# Patient Record
Sex: FEMALE | Race: WHITE | Employment: UNEMPLOYED | URBAN - METROPOLITAN AREA
[De-identification: names, ages, dates, MRNs, and addresses within clinical notes are randomized per-mention and may not be internally consistent; named-entity substitution may affect disease eponyms.]

---

## 2019-01-01 ENCOUNTER — ANESTHESIA EVENT (OUTPATIENT)
Dept: SURGERY | Age: 29
End: 2019-01-01
Payer: MEDICAID

## 2019-01-02 ENCOUNTER — APPOINTMENT (OUTPATIENT)
Dept: GENERAL RADIOLOGY | Age: 29
End: 2019-01-02
Attending: UROLOGY
Payer: MEDICAID

## 2019-01-02 ENCOUNTER — ANESTHESIA (OUTPATIENT)
Dept: SURGERY | Age: 29
End: 2019-01-02
Payer: MEDICAID

## 2019-01-02 ENCOUNTER — HOSPITAL ENCOUNTER (OUTPATIENT)
Age: 29
Setting detail: OUTPATIENT SURGERY
Discharge: HOME | End: 2019-01-02
Attending: UROLOGY | Admitting: UROLOGY
Payer: MEDICAID

## 2019-01-02 VITALS
SYSTOLIC BLOOD PRESSURE: 116 MMHG | WEIGHT: 128.38 LBS | DIASTOLIC BLOOD PRESSURE: 72 MMHG | HEIGHT: 62 IN | TEMPERATURE: 98.1 F | BODY MASS INDEX: 23.62 KG/M2 | HEART RATE: 65 BPM | RESPIRATION RATE: 16 BRPM | OXYGEN SATURATION: 98 %

## 2019-01-02 LAB — HCG UR QL: NEGATIVE

## 2019-01-02 PROCEDURE — 77030018836 HC SOL IRR NACL ICUM -A: Performed by: UROLOGY

## 2019-01-02 PROCEDURE — 74011000272 HC RX REV CODE- 272: Performed by: UROLOGY

## 2019-01-02 PROCEDURE — C1758 CATHETER, URETERAL: HCPCS | Performed by: UROLOGY

## 2019-01-02 PROCEDURE — 76060000032 HC ANESTHESIA 0.5 TO 1 HR: Performed by: UROLOGY

## 2019-01-02 PROCEDURE — 74011250637 HC RX REV CODE- 250/637: Performed by: SPECIALIST

## 2019-01-02 PROCEDURE — 74011250636 HC RX REV CODE- 250/636: Performed by: UROLOGY

## 2019-01-02 PROCEDURE — 77030020782 HC GWN BAIR PAWS FLX 3M -B: Performed by: UROLOGY

## 2019-01-02 PROCEDURE — C1769 GUIDE WIRE: HCPCS | Performed by: UROLOGY

## 2019-01-02 PROCEDURE — 76010000138 HC OR TIME 0.5 TO 1 HR: Performed by: UROLOGY

## 2019-01-02 PROCEDURE — 76210000021 HC REC RM PH II 0.5 TO 1 HR: Performed by: UROLOGY

## 2019-01-02 PROCEDURE — 77030032490 HC SLV COMPR SCD KNE COVD -B: Performed by: UROLOGY

## 2019-01-02 PROCEDURE — 77030012508 HC MSK AIRWY LMA AMBU -A: Performed by: SPECIALIST

## 2019-01-02 PROCEDURE — 74011250636 HC RX REV CODE- 250/636

## 2019-01-02 PROCEDURE — 74011636320 HC RX REV CODE- 636/320: Performed by: UROLOGY

## 2019-01-02 PROCEDURE — 81025 URINE PREGNANCY TEST: CPT

## 2019-01-02 PROCEDURE — C1894 INTRO/SHEATH, NON-LASER: HCPCS | Performed by: UROLOGY

## 2019-01-02 PROCEDURE — 76210000006 HC OR PH I REC 0.5 TO 1 HR: Performed by: UROLOGY

## 2019-01-02 PROCEDURE — 74420 UROGRAPHY RTRGR +-KUB: CPT

## 2019-01-02 PROCEDURE — C2617 STENT, NON-COR, TEM W/O DEL: HCPCS | Performed by: UROLOGY

## 2019-01-02 DEVICE — URETERAL STENT
Type: IMPLANTABLE DEVICE | Site: URETER | Status: FUNCTIONAL
Brand: POLARIS™ ULTRA

## 2019-01-02 RX ORDER — LIDOCAINE HYDROCHLORIDE 20 MG/ML
INJECTION, SOLUTION EPIDURAL; INFILTRATION; INTRACAUDAL; PERINEURAL AS NEEDED
Status: DISCONTINUED | OUTPATIENT
Start: 2019-01-02 | End: 2019-01-02 | Stop reason: HOSPADM

## 2019-01-02 RX ORDER — SODIUM CHLORIDE 0.9 % (FLUSH) 0.9 %
5-10 SYRINGE (ML) INJECTION EVERY 8 HOURS
Status: CANCELLED | OUTPATIENT
Start: 2019-01-02

## 2019-01-02 RX ORDER — MAGNESIUM SULFATE 100 %
4 CRYSTALS MISCELLANEOUS AS NEEDED
Status: DISCONTINUED | OUTPATIENT
Start: 2019-01-02 | End: 2019-01-02 | Stop reason: HOSPADM

## 2019-01-02 RX ORDER — DEXAMETHASONE SODIUM PHOSPHATE 4 MG/ML
INJECTION, SOLUTION INTRA-ARTICULAR; INTRALESIONAL; INTRAMUSCULAR; INTRAVENOUS; SOFT TISSUE AS NEEDED
Status: DISCONTINUED | OUTPATIENT
Start: 2019-01-02 | End: 2019-01-02 | Stop reason: HOSPADM

## 2019-01-02 RX ORDER — CEFAZOLIN SODIUM/WATER 2 G/20 ML
2 SYRINGE (ML) INTRAVENOUS ONCE
Status: COMPLETED | OUTPATIENT
Start: 2019-01-02 | End: 2019-01-02

## 2019-01-02 RX ORDER — SODIUM CHLORIDE, SODIUM LACTATE, POTASSIUM CHLORIDE, CALCIUM CHLORIDE 600; 310; 30; 20 MG/100ML; MG/100ML; MG/100ML; MG/100ML
100 INJECTION, SOLUTION INTRAVENOUS CONTINUOUS
Status: DISCONTINUED | OUTPATIENT
Start: 2019-01-02 | End: 2019-01-02 | Stop reason: HOSPADM

## 2019-01-02 RX ORDER — ONDANSETRON 2 MG/ML
4 INJECTION INTRAMUSCULAR; INTRAVENOUS ONCE
Status: DISCONTINUED | OUTPATIENT
Start: 2019-01-02 | End: 2019-01-02 | Stop reason: HOSPADM

## 2019-01-02 RX ORDER — FLUMAZENIL 0.1 MG/ML
0.2 INJECTION INTRAVENOUS
Status: DISCONTINUED | OUTPATIENT
Start: 2019-01-02 | End: 2019-01-02 | Stop reason: HOSPADM

## 2019-01-02 RX ORDER — SODIUM CHLORIDE 0.9 % (FLUSH) 0.9 %
5-10 SYRINGE (ML) INJECTION AS NEEDED
Status: CANCELLED | OUTPATIENT
Start: 2019-01-02

## 2019-01-02 RX ORDER — ONDANSETRON 2 MG/ML
INJECTION INTRAMUSCULAR; INTRAVENOUS AS NEEDED
Status: DISCONTINUED | OUTPATIENT
Start: 2019-01-02 | End: 2019-01-02 | Stop reason: HOSPADM

## 2019-01-02 RX ORDER — MIDAZOLAM HYDROCHLORIDE 1 MG/ML
INJECTION, SOLUTION INTRAMUSCULAR; INTRAVENOUS AS NEEDED
Status: DISCONTINUED | OUTPATIENT
Start: 2019-01-02 | End: 2019-01-02 | Stop reason: HOSPADM

## 2019-01-02 RX ORDER — INSULIN LISPRO 100 [IU]/ML
INJECTION, SOLUTION INTRAVENOUS; SUBCUTANEOUS ONCE
Status: DISCONTINUED | OUTPATIENT
Start: 2019-01-02 | End: 2019-01-02 | Stop reason: HOSPADM

## 2019-01-02 RX ORDER — PROPOFOL 10 MG/ML
INJECTION, EMULSION INTRAVENOUS AS NEEDED
Status: DISCONTINUED | OUTPATIENT
Start: 2019-01-02 | End: 2019-01-02 | Stop reason: HOSPADM

## 2019-01-02 RX ORDER — DEXTROSE 50 % IN WATER (D50W) INTRAVENOUS SYRINGE
25-50 AS NEEDED
Status: DISCONTINUED | OUTPATIENT
Start: 2019-01-02 | End: 2019-01-02 | Stop reason: HOSPADM

## 2019-01-02 RX ORDER — NALOXONE HYDROCHLORIDE 0.4 MG/ML
0.1 INJECTION, SOLUTION INTRAMUSCULAR; INTRAVENOUS; SUBCUTANEOUS AS NEEDED
Status: DISCONTINUED | OUTPATIENT
Start: 2019-01-02 | End: 2019-01-02 | Stop reason: HOSPADM

## 2019-01-02 RX ORDER — FENTANYL CITRATE 50 UG/ML
INJECTION, SOLUTION INTRAMUSCULAR; INTRAVENOUS AS NEEDED
Status: DISCONTINUED | OUTPATIENT
Start: 2019-01-02 | End: 2019-01-02 | Stop reason: HOSPADM

## 2019-01-02 RX ORDER — SODIUM CHLORIDE, SODIUM LACTATE, POTASSIUM CHLORIDE, CALCIUM CHLORIDE 600; 310; 30; 20 MG/100ML; MG/100ML; MG/100ML; MG/100ML
125 INJECTION, SOLUTION INTRAVENOUS CONTINUOUS
Status: DISCONTINUED | OUTPATIENT
Start: 2019-01-02 | End: 2019-01-02 | Stop reason: HOSPADM

## 2019-01-02 RX ORDER — PHENAZOPYRIDINE HYDROCHLORIDE 100 MG/1
100 TABLET, FILM COATED ORAL
Qty: 15 TAB | Refills: 0 | Status: SHIPPED | OUTPATIENT
Start: 2019-01-02 | End: 2019-01-05

## 2019-01-02 RX ORDER — SODIUM CHLORIDE, SODIUM LACTATE, POTASSIUM CHLORIDE, CALCIUM CHLORIDE 600; 310; 30; 20 MG/100ML; MG/100ML; MG/100ML; MG/100ML
1000 INJECTION, SOLUTION INTRAVENOUS CONTINUOUS
Status: DISCONTINUED | OUTPATIENT
Start: 2019-01-02 | End: 2019-01-02 | Stop reason: HOSPADM

## 2019-01-02 RX ORDER — SODIUM CHLORIDE 0.9 % (FLUSH) 0.9 %
5-10 SYRINGE (ML) INJECTION EVERY 8 HOURS
Status: DISCONTINUED | OUTPATIENT
Start: 2019-01-02 | End: 2019-01-02 | Stop reason: HOSPADM

## 2019-01-02 RX ORDER — OXYCODONE HYDROCHLORIDE 5 MG/1
5 TABLET ORAL
Status: COMPLETED | OUTPATIENT
Start: 2019-01-02 | End: 2019-01-02

## 2019-01-02 RX ORDER — FENTANYL CITRATE 50 UG/ML
25 INJECTION, SOLUTION INTRAMUSCULAR; INTRAVENOUS AS NEEDED
Status: DISCONTINUED | OUTPATIENT
Start: 2019-01-02 | End: 2019-01-02 | Stop reason: HOSPADM

## 2019-01-02 RX ORDER — HYDROMORPHONE HYDROCHLORIDE 2 MG/ML
0.5 INJECTION, SOLUTION INTRAMUSCULAR; INTRAVENOUS; SUBCUTANEOUS
Status: DISCONTINUED | OUTPATIENT
Start: 2019-01-02 | End: 2019-01-02 | Stop reason: HOSPADM

## 2019-01-02 RX ORDER — SODIUM CHLORIDE 0.9 % (FLUSH) 0.9 %
5-10 SYRINGE (ML) INJECTION AS NEEDED
Status: DISCONTINUED | OUTPATIENT
Start: 2019-01-02 | End: 2019-01-02 | Stop reason: HOSPADM

## 2019-01-02 RX ADMIN — DEXAMETHASONE SODIUM PHOSPHATE 4 MG: 4 INJECTION, SOLUTION INTRA-ARTICULAR; INTRALESIONAL; INTRAMUSCULAR; INTRAVENOUS; SOFT TISSUE at 13:29

## 2019-01-02 RX ADMIN — ONDANSETRON 4 MG: 2 INJECTION INTRAMUSCULAR; INTRAVENOUS at 13:29

## 2019-01-02 RX ADMIN — FENTANYL CITRATE 25 MCG: 50 INJECTION, SOLUTION INTRAMUSCULAR; INTRAVENOUS at 13:36

## 2019-01-02 RX ADMIN — Medication 2 G: at 13:30

## 2019-01-02 RX ADMIN — FENTANYL CITRATE 75 MCG: 50 INJECTION, SOLUTION INTRAMUSCULAR; INTRAVENOUS at 14:14

## 2019-01-02 RX ADMIN — OXYCODONE HYDROCHLORIDE 5 MG: 5 TABLET ORAL at 14:25

## 2019-01-02 RX ADMIN — SODIUM CHLORIDE, SODIUM LACTATE, POTASSIUM CHLORIDE, AND CALCIUM CHLORIDE 125 ML/HR: 600; 310; 30; 20 INJECTION, SOLUTION INTRAVENOUS at 12:05

## 2019-01-02 RX ADMIN — MIDAZOLAM HYDROCHLORIDE 4 MG: 1 INJECTION, SOLUTION INTRAMUSCULAR; INTRAVENOUS at 13:19

## 2019-01-02 RX ADMIN — SODIUM CHLORIDE, SODIUM LACTATE, POTASSIUM CHLORIDE, AND CALCIUM CHLORIDE: 600; 310; 30; 20 INJECTION, SOLUTION INTRAVENOUS at 14:13

## 2019-01-02 RX ADMIN — PROPOFOL 200 MG: 10 INJECTION, EMULSION INTRAVENOUS at 13:25

## 2019-01-02 RX ADMIN — LIDOCAINE HYDROCHLORIDE 100 MG: 20 INJECTION, SOLUTION EPIDURAL; INFILTRATION; INTRACAUDAL; PERINEURAL at 13:25

## 2019-01-02 NOTE — ANESTHESIA PREPROCEDURE EVALUATION
Anesthetic History No history of anesthetic complications Review of Systems / Medical History Patient summary reviewed, nursing notes reviewed and pertinent labs reviewed Pulmonary Smoker Neuro/Psych Psychiatric history Cardiovascular Exercise tolerance: >4 METS 
  
GI/Hepatic/Renal 
Within defined limits Endo/Other Within defined limits Other Findings Physical Exam 
 
Airway Mallampati: II 
TM Distance: 4 - 6 cm Neck ROM: normal range of motion Mouth opening: Normal 
 
 Cardiovascular Rhythm: regular Rate: normal 
 
 
 
 Dental 
No notable dental hx Pulmonary Breath sounds clear to auscultation Abdominal 
GI exam deferred Other Findings Anesthetic Plan ASA: 2 Anesthesia type: general 
 
 
 
 
Induction: Intravenous Anesthetic plan and risks discussed with: Patient

## 2019-01-02 NOTE — H&P
H&P Update: 
Cleo Her was seen and examined. History and physical has been reviewed. The patient has been examined.  There have been no significant clinical changes since the completion of the originally dated History and Physical. 
 
Signed By: Derik Avila MD   
 January 2, 2019 1:15 PM

## 2019-01-02 NOTE — PERIOP NOTES
Reviewed PTA medication list with patient/caregiver and patient/caregiver denies any additional medications. Patient admits to having a responsible adult care for them for at least 24 hours after surgery. 
  
Urine pregnancy results Negative and verified with GARETH Winkler

## 2019-01-02 NOTE — ANESTHESIA POSTPROCEDURE EVALUATION
Post-Anesthesia Evaluation and Assessment Patient: Choco Griffith MRN: [id number]  SSN: [id number] YOB: 1990  Age: 33 y.o. Sex: female Cardiovascular Function/Vital Signs /63   Pulse 66   Temp 36.5 °C (97.7 °F)   Resp 11   Ht 5' 2\" (1.575 m)   Wt 58.2 kg (128 lb 6 oz)   SpO2 99%   BMI 23.48 kg/m² Patient is status post Procedure(s): 
CYSTOSCOPY, RIGHT RETROGRADE PYELOGRAM WITH INTERPRETATION,  RIGHT URETEROSCOPY, LASER LITHOTRIPSY WITH HOLMIUM, STENT PLACEMENT WITH C-ARM. Nausea/Vomiting: Controlled. Postoperative hydration reviewed and adequate. Pain: 
Pain Scale 1: Numeric (0 - 10) (01/02/19 1440) Pain Intensity 1: 2 (01/02/19 1440) Managed. Neurological Status:  
Neuro (WDL): Exceptions to WDL (01/02/19 1426) At baseline. Mental Status and Level of Consciousness: Arousable. Pulmonary Status:  
O2 Device: Room air (01/02/19 1440) Adequate oxygenation and airway patent. Complications related to anesthesia: None Post-anesthesia assessment completed. No concerns. Patient has met all discharge requirements. Signed By: Maria Elena Urias CRNA January 2, 2019 Procedure(s): 
CYSTOSCOPY, RIGHT RETROGRADE PYELOGRAM WITH INTERPRETATION,  RIGHT URETEROSCOPY, LASER LITHOTRIPSY WITH HOLMIUM, STENT PLACEMENT WITH C-ARM. <BSHSIANPOST> Visit Vitals /63 Pulse 66 Temp 36.5 °C (97.7 °F) Resp 11 Ht 5' 2\" (1.575 m) Wt 58.2 kg (128 lb 6 oz) SpO2 99% BMI 23.48 kg/m²

## 2019-01-02 NOTE — BRIEF OP NOTE
Date of Procedure: 1/2/2019 Preoperative Diagnosis: RIGHT RENAL STONES Postoperative Diagnosis: RIGHT RENAL STONES Procedure(s): 
CYSTOSCOPY, RIGHT RETROGRADE PYELOGRAM WITH INTERPRETATION,  RIGHT URETEROSCOPY, LASER LITHOTRIPSY WITH HOLMIUM, STENT PLACEMENT WITH C-ARM Surgeon(s) and Role: 
   * Camden Moreno MD - Primary Surgical Assistant: none Surgical Staff: 
Circ-1: Helene Dow Radiology Technician: Ninfa Ledezma Scrub Tech-1: Chiquita Osler Scrub Tech-2: Javan Jason Event Time In Time Out Incision Start [id number] Incision Close 1400 Anesthesia: General  
Estimated Blood Loss: minimal 
Specimens: * No specimens in log * Findings: Right lower pole kidney stone about 1cm. The distal right uretetal stone was not seen. Pt had passed it prior to the case. Complications: None Implants:  
Implant Name Type Inv. Item Serial No.  Lot No. LRB No. Used Action Prudence Tracy DBL-PGTL [id number] Ebony Demarco - [id number]  PLTHIERNOZADRIAN PHYX DBL-PGTL [id number] -- Raegan Matos [facility] [id number] Right 1 Implanted

## 2019-01-02 NOTE — DISCHARGE INSTRUCTIONS
2 [facility], Urology     [facility], [address], [address] [place] [address],  Cecilia Boss  Office: [phone]  Fax:    [phone], right ureteroscopy laser litho, stent placement  __  Notify [facility] IMMEDIATELY if any of the following occur:     You are unable to urinate. Urgency to urinate is not uncommon.   You find yourself urinating small frequent amounts associated with severe lower abdominal discomfort.   Bright red blood with clots in the urine. Some reddish urine is not uncommon and should be treated with increasing the amount of fluids you drink.   Temperature above 101.5° and / or chills.   You are nauseous and / or vomiting and you cannot hold down any fluids.   Your pain is not controlled with the pain medication prescribed. Special Considerations:      Do not drive for at least 24 hours after the procedure and until you are no longer taking narcotic pain medication and you are able to move and react without hesitation.  _x_  No heavy lifting over 30 pounds & no strenuous exercise for 1 week. Resume your pain meds. If your toradol/ketorolac is not approved by your insurance, you can take 600mg Motrin every 6 hours as needed for no more than 1 week. Use your narcotics in between as needed.  _x_  Our office will call you to make an appointment next week for cysto, right stent removal.     Please contact [facility]. Urology at [phone] or go to the nearest Emergency Department / Urgent Care facility for any other medical questions or concerns.     DISCHARGE SUMMARY from Nurse    PATIENT INSTRUCTIONS:    After general anesthesia or intravenous sedation, for 24 hours or while taking prescription Narcotics:  · Limit your activities  · Do not drive and operate hazardous machinery  · Do not make important personal or business decisions  · Do  not drink alcoholic beverages  · If you have not urinated within 8 hours after discharge, please contact your surgeon on call. Report the following to your surgeon:  · Excessive pain, swelling, redness or odor of or around the surgical area  · Temperature over 100.5  · Nausea and vomiting lasting longer than 4 hours or if unable to take medications  · Any signs of decreased circulation or nerve impairment to extremity: change in color, persistent  numbness, tingling, coldness or increase pain  · Any questions    What to do at Home:  Recommended activity: Activity as tolerated and no driving for today,     If you experience any of the following symptoms as above, please follow up with Dr Caitiln Torrez. *  Please give a list of your current medications to your Primary Care Provider. *  Please update this list whenever your medications are discontinued, doses are      changed, or new medications (including over-the-counter products) are added. *  Please carry medication information at all times in case of emergency situations. These are general instructions for a healthy lifestyle:    No smoking/ No tobacco products/ Avoid exposure to second hand smoke  Surgeon General's Warning:  Quitting smoking now greatly reduces serious risk to your health. Obesity, smoking, and sedentary lifestyle greatly increases your risk for illness    A healthy diet, regular physical exercise & weight monitoring are important for maintaining a healthy lifestyle    You may be retaining fluid if you have a history of heart failure or if you experience any of the following symptoms:  Weight gain of 3 pounds or more overnight or 5 pounds in a week, increased swelling in our hands or feet or shortness of breath while lying flat in bed. Please call your doctor as soon as you notice any of these symptoms; do not wait until your next office visit.     Recognize signs and symptoms of STROKE:    F-face looks uneven    A-arms unable to move or move unevenly    S-speech slurred or non-existent    T-time-call 911 as soon as signs and symptoms begin-DO NOT go       Back to bed or wait to see if you get better-TIME IS BRAIN. Warning Signs of HEART ATTACK     Call 911 if you have these symptoms:   Chest discomfort. Most heart attacks involve discomfort in the center of the chest that lasts more than a few minutes, or that goes away and comes back. It can feel like uncomfortable pressure, squeezing, fullness, or pain.  Discomfort in other areas of the upper body. Symptoms can include pain or discomfort in one or both arms, the back, neck, jaw, or stomach.  Shortness of breath with or without chest discomfort.  Other signs may include breaking out in a cold sweat, nausea, or lightheadedness. Don't wait more than five minutes to call 911 - MINUTES MATTER! Fast action can save your life. Calling 911 is almost always the fastest way to get lifesaving treatment. Emergency Medical Services staff can begin treatment when they arrive -- up to an hour sooner than if someone gets to the hospital by car. Patient armband removed and shredded  The discharge information has been reviewed with the patient and caregiver. The patient and caregiver verbalized understanding. Discharge medications reviewed with the patient and caregiver and appropriate educational materials and side effects teaching were provided.   ___________________________________________________________________________________________________________________________________

## 2019-01-02 NOTE — H&P
Urology [address]  [address] Tel: [phone] Fax: [phone] Patient: Bakari Dent YOB: 1990 Date: 2018 3:30 PM  
Visit Type: Consult Assessment/Plan # Detail Type Description 1. Assessment Kidney stones (N20.0). Patient Plan Pt with symptomatic stone with the right ureteral and kidney stone. She needs intervention. Discussed ESWL and URS. She will think about it. Toradol and percocet ordered. Pt will let me know if pain gets worse then we will intervene. She would prefer URS considering there are two stones. I discussed the associated risk and benefits. All questions were answered. Pt to f/u in 2 weeks w/ KUB. This 33year old female presents for Kidney and/or Ureteral Stone. History of Present Illness: 1. Kidney and/or Ureteral Ranelle Sravani Onset was gradual. It occurs intermittently. The problem is with no change. Location of pain is right flank. The pain radiates to the abdomen. There is no prior history of stones. Recent ER visit. No prior pertinent history. Relieving factors include analgesics. Associated symptoms include pain. Pertinent negatives include chills, constipation, diarrhea, fever, nausea, dribbling (urinary), frequency (urinary), urinary straining and vomiting. Additional information: Pt was at [facility] 6mm rt distal ureteral stone w/ hydro and rt 1cm lower pole stone. LT kidney stones 4mm x 2.. PAST MEDICAL/SURGICAL HISTORY   (Detailed) Disease/disorder Onset Date Management Date Comments Pregnancy-full term [id number]  - Full term [id number] GYNECOLOGIC HISTORY: 
Patient is premenopausal.  
OBSTETRIC HISTORY: 
Currently pregnant. PROBLEM LIST:   Problem List reviewed. Medications (active prior to today) Medication Instructions Start Date Stop Date Refilled Elsewhere Prenatal Vitamin tablet take 1 tablet by oral route  every day 2016  N Percocet 5 mg-325 mg tablet take 1 - 2 tablet by oral route  every 4 - 6 hours as needed 2018 N  
ibuprofen 800 mg tablet take 1 tablet by oral route 3 times every day with food 2018  N Flomax 0.4 mg capsule take 1 capsule by oral route  every day 1/2 hour following the same meal each day //   Y Medication Reconciliation Medications reconciled today. Allergies Ingredient Reaction (Severity) Medication Name Comment NO KNOWN ALLERGIES Reviewed, no changes. Family History  (Detailed) Relationship Family Member Name  Age at Death Condition Onset Age Cause of Death Family history of Diabetes mellitus  N Family history of Hypertension  N Family history of Cancer, breast  N Family history of Obesity  N Social History:  (Detailed) Tobacco use reviewed. Preferred language is [place]. MARITAL STATUS/FAMILY/SOCIAL SUPPORT Currently single. CHILDREN Has children:  1 son(s). Tobacco use status: Light cigarette smoker (1-9 cigs/day). Smoking status: Light tobacco smoker. SMOKING STATUS Use Status Type Smoking Status Usage Per Day Years Used Total Pack Years  
yes Cigarette Light tobacco smoker 5 Cigarettes TOBACCO/VAPING EXPOSURE No passive smoke exposure. ALCOHOL There is a history of alcohol use. Type: Beer and liquor. 2 drinks consumed occasionally. CAFFEINE The patient uses caffeine: soda - 1 cup a day. LIFESTYLE 
PETS 
 cats. Baptism/SPIRITUAL Patient agrees to transfusion. HOME ENVIRONMENT/SAFETY Uses seat belts. Review of Systems System Neg/Pos Details Constitutional Positive Pain. Constitutional Negative Chills and Fever. ENMT Negative Ear infections and Sore throat. Eyes Negative Blurred vision, Double vision and Eye pain. Respiratory Negative Asthma, Chronic cough, Dyspnea and Wheezing. Cardio Negative Chest pain. GI Negative Constipation, Decreased appetite, Diarrhea, Nausea and Vomiting.  Negative Urinary dribbling, Urinary frequency and Urinary straining. Endocrine Negative Cold intolerance, Heat intolerance, Increased thirst and Weight loss. Neuro Negative Headache and Tremors. Psych Negative Anxiety and Depression. Integumentary Negative Itching skin and Rash. MS Negative Back pain and Joint pain. Hema/Lymph Negative Easy bleeding. Reproductive Positive The patient is pre-menopausal.  
 
Vital Signs Gynecologic History Patient is premenopausal.   
 
Height Time ft in cm Last Measured Height Position 3:37 PM 5.0 2.00 157.48 12/27/2018 Standing Weight/BSA/BMI Time lb oz kg Context BMI kg/m2 BSA m2  
3:37 .00  57.606 dressed with shoes 23.23 Blood Pressure Time BP mm/Hg Position Side Site Method Cuff Size 3:37 /80 sitting right  automatic adult Temperature/Pulse/Respiration Time Temp F Temp C Temp Site Pulse/min Pattern Resp/ min 3:37 PM 97.34 36.30  106 Measured By Time Measured by  
3:37 PM Brita Kocher Physical Exam 
Exam Findings Details Constitutional Normal Well developed. Head/Face Normal Facial features - Normal.  
Nose/Mouth/Throat Normal Tongue - Normal. Buccal mucosa - Normal.  
Neck Exam Normal Inspection - Normal. Palpation - Normal. Thyroid gland - Normal.  
Lymph Detail Normal Submandibular. Supraclavicular. Respiratory Normal Effort - Normal.  
Abdomen Normal No abdominal tenderness. No hepatic enlargement. No spleen enlargement. No hernia. Genitourinary * CVA tenderness - RT. Genitourinary Normal No Suprapubic tenderness. No Flank mass Skin Normal Inspection - Normal.  
Musculoskeletal Normal Visual overview of all four extremities is normal. Gait - Normal.  
Extremity Normal No Calf tenderness. No Edema. Neurological Normal Level of consciousness - Normal. Orientation - Normal. Memory - Normal.  
Psychiatric Normal Orientation - Oriented to time, place, person & situation. Appropriate mood and affect. Patient Education # Patient Education 1. Kidney Stone: Care Instructions Medications (added, continued, or stopped today) Start Date Medication Directions PRN Status PRN Reason Instruction Stop Date Flomax 0.4 mg capsule take 1 capsule by oral route  every day 1/2 hour following the same meal each day N     
09/27/2018 ibuprofen 800 mg tablet take 1 tablet by oral route 3 times every day with food N   12/27/2018 12/27/2018 ketorolac 10 mg tablet take 1 tablet by oral route  every 6 hours as needed for up to 5 days total use N     
12/27/2018 Percocet 5 mg-325 mg tablet take 1 - 2 tablet by oral route  every 4 - 6 hours as needed N     
09/27/2018 Percocet 5 mg-325 mg tablet take 1 - 2 tablet by oral route  every 4 - 6 hours as needed N   12/27/2018 11/30/2016 Prenatal Vitamin tablet take 1 tablet by oral route  every day N   12/27/2018 Active Patient Care Team Members Name Contact Agency Type Support Role Relationship Active Date Inactive Date Specialty Ottoniel Chandler    Parent, Child is the Patient PCP NO   Patient provider PCP Provider:  
Cristina Salinas  12/31/2018 3:30 PM  
Document generated by:  Kerri Thomson 12/31/2018 03:30 PM

## 2019-01-02 NOTE — PERIOP NOTES
TRANSFER - OUT REPORT: 
 
Verbal report given to [facility] - DANDY RAYMOND on Maryann Don  being transferred to Phase II for routine progression of care Report consisted of patients Situation, Background, Assessment and  
Recommendations(SBAR). Information from the following report(s) SBAR was reviewed with the receiving nurse. Lines:  
Peripheral IV 01/02/19 Right Hand (Active) Site Assessment Clean, dry, & intact 1/2/2019  2:26 PM  
Phlebitis Assessment 0 1/2/2019  2:26 PM  
Infiltration Assessment 0 1/2/2019  2:26 PM  
Dressing Status Clean, dry, & intact 1/2/2019  2:26 PM  
Dressing Type Transparent;Tape 1/2/2019  2:26 PM  
Hub Color/Line Status Pink;Patent; Infusing 1/2/2019  2:26 PM  
Action Taken Armboard 1/2/2019  1:31 PM  
  
Visit Vitals /63 Pulse 66 Temp 97.7 °F (36.5 °C) Resp 11 Ht 5' 2\" (1.575 m) Wt 58.2 kg (128 lb 6 oz) SpO2 99% BMI 23.48 kg/m² Intake/Output Summary (Last 24 hours) at 1/2/2019 1450 Last data filed at 1/2/2019 1441 Gross per 24 hour Intake 1420 ml Output  Net 1420 ml Opportunity for questions and clarification was provided. Patient transported with: 
 [employer] Lilliana Sorenson RN

## 2019-01-03 NOTE — BRIEF OP NOTE
BRIEF OPERATIVE NOTE Date of Procedure: 1/2/2019 Preoperative Diagnosis: RIGHT RENAL STONE AND URETERAL STONE Postoperative Diagnosis:  RIGHT RENAL STONE Procedure(s): 
CYSTOSCOPY, RIGHT RETROGRADE PYELOGRAM WITH INTERPRETATION,  RIGHT URETEROSCOPY, LASER LITHOTRIPSY WITH HOLMIUM, STENT PLACEMENT WITH C-ARM Surgeon(s) and Role: 
   * Nadya Moss MD - Primary Surgical Assistant: None Surgical Staff: 
Circ-1: Óscar Esquivel Radiology Technician: Judith Young Scrub Tech-1: Floresita Cruz Scrub Tech-2: Shahid Goldman Event Time In Time Out Incision Start [id number] Incision Close 1400 Anesthesia: General  
Estimated Blood Loss: minimal 
Specimens: * No specimens in log * Findings: Right lower pole stone about 1cm. The distal ureteral stone was not seen, so patient likely had passed it prior to surgery. Complications: None Implants:  
Implant Name Type Inv. Item Serial No.  Lot No. LRB No. Used Action Brigidnadia Luiz DBL-PGTL [id number] Kae Ponceer - [id number]  [facility] DBL-PGTL [id number] -- [facility] [id number] Right 1 Implanted Description of procedure: 
  
On the day of operation, the patient was explained and understood all the risks, benefits, and alternatives of the procedure were explained to her and she was then brought to the operative theater and placed on the table in a supine position. General anesthetic was administered. Patient received antibiotics prior to the beginning of procedure. A time-out was performed. She was moved into the dorsal lithotomy position, and prepped and draped in a usual sterile fashion. 
  
We then inserted a 21-German cystoscope with a 30 degree lens in an atraumatic fashion into the patient's bladder. The urethra was normal. Bladder was inspected and normal. We then identified th left ureteral orifice.  An open ended catheter was then used to perform retrograde pyelogram with 10 ml of visipaque contrast dye which showed findings as above  A sensor wire was placed in to the renal pelvis and a dual lumen was used to placed a second wire. The ureteral access sheath was placed over the working wire. The other wire was used as a safety. The flexible ureteroscope was place via the access sheath into the renal pelvis. The stone was identified and treated with 272 micro laser. The flexible ureteroscope along with the sheath was removed visually. A stent was placed as above in the standard fashion. The curl in kidney and bladder were noted fluoroscopically and visually, respectively. The bladder was drained and the transferred to the supine position extubated and transported to the PACU in stable condition.

## 2019-01-17 NOTE — OP NOTES
Date of Procedure: 1/2/2019   Preoperative Diagnosis: RIGHT RENAL STONE AND URETERAL STONE  Postoperative Diagnosis:  RIGHT RENAL STONE   Procedure(s):  CYSTOSCOPY, RIGHT RETROGRADE PYELOGRAM WITH INTERPRETATION,  RIGHT URETEROSCOPY, LASER LITHOTRIPSY WITH HOLMIUM, STENT PLACEMENT WITH C-ARM  Surgeon(s) and Role:     * Giovanni Thomson MD - Primary         Surgical Assistant: None     Surgical Staff:  Circ-1: Demetrius Messer  Radiology Technician: Geannie Alpers  Scrub Tech-1: Ivy Galeas  Scrub Tech-2: Chel Gann  Event Time In Time Out   Incision Start 1338     Incision Close 1400        Anesthesia: General   Estimated Blood Loss: minimal  Specimens: * No specimens in log *   Findings: Right lower pole stone about 1cm. The distal ureteral stone was not seen, so patient likely had passed it prior to surgery. Complications: None  Implants:   Implant Name Type Inv. Item Serial No.  Lot No. LRB No. Used Action   STENT URET DBL-PGTL [id number] Joby Bigger   STENT URET DBL-PGTL [id number] -- POLARIS   [facility] [id number] Right 1 Implanted         Description of procedure:     On the day of operation, the patient was explained and understood all the risks, benefits, and alternatives of the procedure were explained to her and she was then brought to the operative theater and placed on the table in a supine position. General anesthetic was administered. Patient received antibiotics prior to the beginning of procedure. A time-out was performed. She was moved into the dorsal lithotomy position, and prepped and draped in a usual sterile fashion.     We then inserted a 21-German cystoscope with a 30 degree lens in an atraumatic fashion into the patient's bladder. The urethra was normal. Bladder was inspected and normal. We then identified th left ureteral orifice.  An open ended catheter was then used to perform retrograde pyelogram with 10 ml of visipaque contrast dye which showed findings as above  A sensor wire was placed in to the renal pelvis and a dual lumen was used to placed a second wire. The ureteral access sheath was placed over the working wire. The other wire was used as a safety. The flexible ureteroscope was place via the access sheath into the renal pelvis. The stone was identified and treated with 272 micro laser. The flexible ureteroscope along with the sheath was removed visually. A stent was placed as above in the standard fashion. The curl in kidney and bladder were noted fluoroscopically and visually, respectively.  The bladder was drained and the transferred to the supine position extubated and transported to the PACU in stable condition.

## 2020-04-17 ENCOUNTER — APPOINTMENT (OUTPATIENT)
Dept: CT IMAGING | Age: 30
End: 2020-04-17
Attending: EMERGENCY MEDICINE
Payer: COMMERCIAL

## 2020-04-17 ENCOUNTER — HOSPITAL ENCOUNTER (EMERGENCY)
Age: 30
Discharge: HOME | End: 2020-04-17
Attending: EMERGENCY MEDICINE | Admitting: EMERGENCY MEDICINE
Payer: COMMERCIAL

## 2020-04-17 VITALS
OXYGEN SATURATION: 100 % | RESPIRATION RATE: 18 BRPM | DIASTOLIC BLOOD PRESSURE: 65 MMHG | HEART RATE: 91 BPM | SYSTOLIC BLOOD PRESSURE: 124 MMHG | TEMPERATURE: 97.9 F | WEIGHT: 125 LBS | BODY MASS INDEX: 22.86 KG/M2

## 2020-04-17 DIAGNOSIS — R31.9 HEMATURIA, UNSPECIFIED TYPE: ICD-10-CM

## 2020-04-17 DIAGNOSIS — R10.9 FLANK PAIN: Primary | ICD-10-CM

## 2020-04-17 LAB
ALBUMIN SERPL-MCNC: 3.9 G/DL (ref 3.4–5)
ALBUMIN/GLOB SERPL: 1.4 {RATIO} (ref 0.8–1.7)
ALP SERPL-CCNC: 47 U/L (ref 45–117)
ALT SERPL-CCNC: 14 U/L (ref 13–56)
ANION GAP SERPL CALC-SCNC: 3 MMOL/L (ref 3–18)
APPEARANCE UR: ABNORMAL
AST SERPL-CCNC: 11 U/L (ref 10–38)
BACTERIA URNS QL MICRO: ABNORMAL /HPF
BASOPHILS # BLD: 0 K/UL (ref 0–0.1)
BASOPHILS NFR BLD: 1 % (ref 0–2)
BILIRUB SERPL-MCNC: 0.4 MG/DL (ref 0.2–1)
BILIRUB UR QL: NEGATIVE
BUN SERPL-MCNC: 15 MG/DL (ref 7–18)
BUN/CREAT SERPL: 19 (ref 12–20)
CALCIUM SERPL-MCNC: 8.7 MG/DL (ref 8.5–10.1)
CHLORIDE SERPL-SCNC: 106 MMOL/L (ref 100–111)
CO2 SERPL-SCNC: 28 MMOL/L (ref 21–32)
COLOR UR: ABNORMAL
CREAT SERPL-MCNC: 0.79 MG/DL (ref 0.6–1.3)
DIFFERENTIAL METHOD BLD: NORMAL
EOSINOPHIL # BLD: 0.1 K/UL (ref 0–0.4)
EOSINOPHIL NFR BLD: 1 % (ref 0–5)
EPITH CASTS URNS QL MICRO: ABNORMAL /LPF (ref 0–5)
ERYTHROCYTE [DISTWIDTH] IN BLOOD BY AUTOMATED COUNT: 12.8 % (ref 11.6–14.5)
GLOBULIN SER CALC-MCNC: 2.8 G/DL (ref 2–4)
GLUCOSE SERPL-MCNC: 84 MG/DL (ref 74–99)
GLUCOSE UR STRIP.AUTO-MCNC: NEGATIVE MG/DL
HCG SERPL QL: NEGATIVE
HCG UR QL: NEGATIVE
HCT VFR BLD AUTO: 41.2 % (ref 35–45)
HGB BLD-MCNC: 14.1 G/DL (ref 12–16)
HGB UR QL STRIP: ABNORMAL
KETONES UR QL STRIP.AUTO: NEGATIVE MG/DL
LEUKOCYTE ESTERASE UR QL STRIP.AUTO: ABNORMAL
LIPASE SERPL-CCNC: 109 U/L (ref 73–393)
LYMPHOCYTES # BLD: 1.3 K/UL (ref 0.9–3.6)
LYMPHOCYTES NFR BLD: 25 % (ref 21–52)
MCH RBC QN AUTO: 30.2 PG (ref 24–34)
MCHC RBC AUTO-ENTMCNC: 34.2 G/DL (ref 31–37)
MCV RBC AUTO: 88.2 FL (ref 74–97)
MONOCYTES # BLD: 0.4 K/UL (ref 0.05–1.2)
MONOCYTES NFR BLD: 8 % (ref 3–10)
NEUTS SEG # BLD: 3.3 K/UL (ref 1.8–8)
NEUTS SEG NFR BLD: 65 % (ref 40–73)
NITRITE UR QL STRIP.AUTO: NEGATIVE
PH UR STRIP: 5.5 [PH] (ref 5–8)
PLATELET # BLD AUTO: 237 K/UL (ref 135–420)
PMV BLD AUTO: 9.4 FL (ref 9.2–11.8)
POTASSIUM SERPL-SCNC: 4 MMOL/L (ref 3.5–5.5)
PROT SERPL-MCNC: 6.7 G/DL (ref 6.4–8.2)
PROT UR STRIP-MCNC: 100 MG/DL
RBC # BLD AUTO: 4.67 M/UL (ref 4.2–5.3)
RBC #/AREA URNS HPF: ABNORMAL /HPF (ref 0–5)
SODIUM SERPL-SCNC: 137 MMOL/L (ref 136–145)
SP GR UR REFRACTOMETRY: 1.02 (ref 1–1.03)
UROBILINOGEN UR QL STRIP.AUTO: 1 EU/DL (ref 0.2–1)
WBC # BLD AUTO: 5.1 K/UL (ref 4.6–13.2)
WBC URNS QL MICRO: ABNORMAL /HPF (ref 0–5)

## 2020-04-17 PROCEDURE — 74011250637 HC RX REV CODE- 250/637: Performed by: EMERGENCY MEDICINE

## 2020-04-17 PROCEDURE — 87086 URINE CULTURE/COLONY COUNT: CPT

## 2020-04-17 PROCEDURE — 74011250636 HC RX REV CODE- 250/636: Performed by: EMERGENCY MEDICINE

## 2020-04-17 PROCEDURE — 84703 CHORIONIC GONADOTROPIN ASSAY: CPT

## 2020-04-17 PROCEDURE — 81001 URINALYSIS AUTO W/SCOPE: CPT

## 2020-04-17 PROCEDURE — 83690 ASSAY OF LIPASE: CPT

## 2020-04-17 PROCEDURE — 80053 COMPREHEN METABOLIC PANEL: CPT

## 2020-04-17 PROCEDURE — 74176 CT ABD & PELVIS W/O CONTRAST: CPT

## 2020-04-17 PROCEDURE — 96375 TX/PRO/DX INJ NEW DRUG ADDON: CPT

## 2020-04-17 PROCEDURE — 85025 COMPLETE CBC W/AUTO DIFF WBC: CPT

## 2020-04-17 PROCEDURE — 99285 EMERGENCY DEPT VISIT HI MDM: CPT

## 2020-04-17 PROCEDURE — 96374 THER/PROPH/DIAG INJ IV PUSH: CPT

## 2020-04-17 PROCEDURE — 81025 URINE PREGNANCY TEST: CPT

## 2020-04-17 RX ORDER — MORPHINE SULFATE 4 MG/ML
4 INJECTION INTRAVENOUS
Status: DISCONTINUED | OUTPATIENT
Start: 2020-04-17 | End: 2020-04-17

## 2020-04-17 RX ORDER — KETOROLAC TROMETHAMINE 30 MG/ML
30 INJECTION, SOLUTION INTRAMUSCULAR; INTRAVENOUS
Status: COMPLETED | OUTPATIENT
Start: 2020-04-17 | End: 2020-04-17

## 2020-04-17 RX ORDER — ONDANSETRON 2 MG/ML
4 INJECTION INTRAMUSCULAR; INTRAVENOUS
Status: COMPLETED | OUTPATIENT
Start: 2020-04-17 | End: 2020-04-17

## 2020-04-17 RX ORDER — DICYCLOMINE HYDROCHLORIDE 10 MG/1
20 CAPSULE ORAL
Status: COMPLETED | OUTPATIENT
Start: 2020-04-17 | End: 2020-04-17

## 2020-04-17 RX ORDER — ONDANSETRON 2 MG/ML
4 INJECTION INTRAMUSCULAR; INTRAVENOUS
Status: DISCONTINUED | OUTPATIENT
Start: 2020-04-17 | End: 2020-04-17

## 2020-04-17 RX ADMIN — KETOROLAC TROMETHAMINE 30 MG: 30 INJECTION, SOLUTION INTRAMUSCULAR at 07:40

## 2020-04-17 RX ADMIN — DICYCLOMINE HYDROCHLORIDE 20 MG: 10 CAPSULE ORAL at 07:40

## 2020-04-17 RX ADMIN — SODIUM CHLORIDE 1000 ML: 900 INJECTION, SOLUTION INTRAVENOUS at 07:55

## 2020-04-17 RX ADMIN — ONDANSETRON 4 MG: 2 INJECTION INTRAMUSCULAR; INTRAVENOUS at 07:40

## 2020-04-17 NOTE — ED PROVIDER NOTES
EMERGENCY DEPARTMENT HISTORY AND PHYSICAL EXAM    Date: 2020  Patient Name: Macie Elam    History of Presenting Illness     Chief Complaint   Patient presents with    Flank Pain         History Provided By: Patient    Additional History (Context):     6:50 AM    Macie Elam is a 30 y.o. female with pertinent PMHx of CKD (kidney stones last seen 2019) or anxiety presenting ambulatory to the ED c/o waxing and waning 5/10 sharp left sided flank pain x 0300 this morning. Pt notes an associated symptom of hematuria; but denies N/V/D, fever/chills, cough, SOB, or vaginal bleeding. Pt states that her pain feels like her h/o kidney stones. Pt has not taken anything for pain at home. Pt denies any chance of pregnancy. Pt denies any allergies to medications. Urology: Richi Delgado MD (urologist)  Pt does not smoke tobacco, drink EtOH excessively, or do illicit drugs. There are no other complaints, changes, or physical findings at this time. Current Facility-Administered Medications   Medication Dose Route Frequency Provider Last Rate Last Dose    sodium chloride 0.9 % bolus infusion 1,000 mL  1,000 mL IntraVENous ONCE Christiane Harris MD 1,000 mL/hr at 20 0755 1,000 mL at 20 0755     Current Outpatient Medications   Medication Sig Dispense Refill    tamsulosin (FLOMAX) 0.4 mg capsule Take 0.4 mg by mouth daily.  oxyCODONE-acetaminophen (PERCOCET 10)  mg per tablet Take  by mouth every six (6) hours as needed for Pain. Past History     Past Medical History:  Past Medical History:   Diagnosis Date    Chronic kidney disease     Kidney stones    Psychiatric disorder     anxiety       Past Surgical History:  Past Surgical History:   Procedure Laterality Date    HX BREAST AUGMENTATION  2017    HX  SECTION  2017    HX WISDOM TEETH EXTRACTION         Family History:  History reviewed. No pertinent family history.     Social History:  Social History Tobacco Use    Smoking status: Current Every Day Smoker     Packs/day: 0.50    Smokeless tobacco: Never Used    Tobacco comment: Instructed no smoking 24 hours prior to sx   Substance Use Topics    Alcohol use: Yes     Comment: rarely    Drug use: No       Allergies: Allergies   Allergen Reactions    Tape [Adhesive] Rash     Red ad itching         Review of Systems   Review of Systems   Constitutional: Negative for chills and fever. Respiratory: Negative for cough and shortness of breath. Gastrointestinal: Negative for diarrhea, nausea and vomiting. Genitourinary: Positive for flank pain (left sided) and hematuria. Negative for vaginal bleeding. All other systems reviewed and are negative. Physical Exam     Vitals:    04/17/20 0637   BP: 124/65   Pulse: 91   Resp: 18   Temp: 97.9 °F (36.6 °C)   SpO2: 100%   Weight: 56.7 kg (125 lb)     Physical Exam  Vitals signs and nursing note reviewed. Constitutional:       General: She is not in acute distress. Appearance: She is well-developed. She is not diaphoretic. Comments: Well appearing, nontoxic   HENT:      Head: Normocephalic and atraumatic. Right Ear: External ear normal.      Left Ear: External ear normal.      Mouth/Throat:      Pharynx: No oropharyngeal exudate. Eyes:      General: No scleral icterus. Conjunctiva/sclera: Conjunctivae normal.      Pupils: Pupils are equal, round, and reactive to light. Comments: No pallor   Neck:      Musculoskeletal: Normal range of motion and neck supple. Thyroid: No thyromegaly. Vascular: No JVD. Trachea: No tracheal deviation. Cardiovascular:      Rate and Rhythm: Normal rate and regular rhythm. Heart sounds: Normal heart sounds. Pulmonary:      Effort: Pulmonary effort is normal. No respiratory distress. Breath sounds: Normal breath sounds. No stridor. Abdominal:      General: Bowel sounds are normal. There is no distension.       Palpations: Abdomen is soft. Tenderness: There is no abdominal tenderness. There is right CVA tenderness (minimal). There is no guarding or rebound. Musculoskeletal: Normal range of motion. General: No tenderness. Comments: No soft tissue injuries   Lymphadenopathy:      Cervical: No cervical adenopathy. Skin:     General: Skin is warm and dry. Findings: No erythema or rash. Neurological:      Mental Status: She is alert and oriented to person, place, and time. Cranial Nerves: No cranial nerve deficit. Coordination: Coordination normal.      Deep Tendon Reflexes: Reflexes are normal and symmetric. Reflexes normal.   Psychiatric:         Behavior: Behavior normal.         Thought Content:  Thought content normal.         Judgment: Judgment normal.         Diagnostic Study Results     Labs -     Recent Results (from the past 12 hour(s))   URINALYSIS W/ RFLX MICROSCOPIC    Collection Time: 04/17/20  6:40 AM   Result Value Ref Range    Color ORANGE      Appearance TURBID      Specific gravity 1.025 1.005 - 1.030      pH (UA) 5.5 5.0 - 8.0      Protein 100 (A) NEG mg/dL    Glucose Negative NEG mg/dL    Ketone Negative NEG mg/dL    Bilirubin Negative NEG      Blood LARGE (A) NEG      Urobilinogen 1.0 0.2 - 1.0 EU/dL    Nitrites Negative NEG      Leukocyte Esterase SMALL (A) NEG     URINE MICROSCOPIC ONLY    Collection Time: 04/17/20  6:40 AM   Result Value Ref Range    WBC 0 to 5 0 - 5 /hpf    RBC TOO NUMEROUS TO COUNT 0 - 5 /hpf    Epithelial cells 1+ 0 - 5 /lpf    Bacteria 1+ (A) NEG /hpf   HCG URINE, QL. - POC    Collection Time: 04/17/20  6:50 AM   Result Value Ref Range    Pregnancy test,urine (POC) Negative NEG     CBC WITH AUTOMATED DIFF    Collection Time: 04/17/20  7:29 AM   Result Value Ref Range    WBC 5.1 4.6 - 13.2 K/uL    RBC 4.67 4.20 - 5.30 M/uL    HGB 14.1 12.0 - 16.0 g/dL    HCT 41.2 35.0 - 45.0 %    MCV 88.2 74.0 - 97.0 FL    MCH 30.2 24.0 - 34.0 PG    MCHC 34.2 31.0 - 37.0 g/dL    RDW 12.8 11.6 - 14.5 %    PLATELET 361 234 - 721 K/uL    MPV 9.4 9.2 - 11.8 FL    NEUTROPHILS 65 40 - 73 %    LYMPHOCYTES 25 21 - 52 %    MONOCYTES 8 3 - 10 %    EOSINOPHILS 1 0 - 5 %    BASOPHILS 1 0 - 2 %    ABS. NEUTROPHILS 3.3 1.8 - 8.0 K/UL    ABS. LYMPHOCYTES 1.3 0.9 - 3.6 K/UL    ABS. MONOCYTES 0.4 0.05 - 1.2 K/UL    ABS. EOSINOPHILS 0.1 0.0 - 0.4 K/UL    ABS. BASOPHILS 0.0 0.0 - 0.1 K/UL    DF AUTOMATED     METABOLIC PANEL, COMPREHENSIVE    Collection Time: 04/17/20  7:29 AM   Result Value Ref Range    Sodium 137 136 - 145 mmol/L    Potassium 4.0 3.5 - 5.5 mmol/L    Chloride 106 100 - 111 mmol/L    CO2 28 21 - 32 mmol/L    Anion gap 3 3.0 - 18 mmol/L    Glucose 84 74 - 99 mg/dL    BUN 15 7.0 - 18 MG/DL    Creatinine 0.79 0.6 - 1.3 MG/DL    BUN/Creatinine ratio 19 12 - 20      GFR est AA >60 >60 ml/min/1.73m2    GFR est non-AA >60 >60 ml/min/1.73m2    Calcium 8.7 8.5 - 10.1 MG/DL    Bilirubin, total 0.4 0.2 - 1.0 MG/DL    ALT (SGPT) 14 13 - 56 U/L    AST (SGOT) 11 10 - 38 U/L    Alk. phosphatase 47 45 - 117 U/L    Protein, total 6.7 6.4 - 8.2 g/dL    Albumin 3.9 3.4 - 5.0 g/dL    Globulin 2.8 2.0 - 4.0 g/dL    A-G Ratio 1.4 0.8 - 1.7     LIPASE    Collection Time: 04/17/20  7:29 AM   Result Value Ref Range    Lipase 109 73 - 393 U/L   HCG QL SERUM    Collection Time: 04/17/20  7:29 AM   Result Value Ref Range    HCG, Ql. Negative NEG         Radiologic Studies -   CT ABD PELV WO CONT   Final Result   IMPRESSION:      Nonobstructive left and right renal calculi. No hydronephrosis or evidence of an   acute obstructive uropathy. No ureteral calculus. Mild, nonspecific   circumferential bladder wall thickening raises concern for cystitis. No acute   abnormality otherwise to explain patient's reported symptoms. CT Results  (Last 48 hours)               04/17/20 0722  CT ABD PELV WO CONT Final result    Impression:  IMPRESSION:       Nonobstructive left and right renal calculi.  No hydronephrosis or evidence of an   acute obstructive uropathy. No ureteral calculus. Mild, nonspecific   circumferential bladder wall thickening raises concern for cystitis. No acute   abnormality otherwise to explain patient's reported symptoms. Narrative:  EXAM: CT ABD PELV WO CONT       CLINICAL INDICATION/HISTORY: Right flank pain       COMPARISON: Retrograde urography 1/2/2019       TECHNIQUE:   CT of the abdomen and pelvis without intravenous contrast   administration, stone protocol. Coronal and sagittal reformats were generated   and reviewed. One or more dose reduction techniques were used on this CT:   automated exposure control, adjustment of the mAs and/or kVp according to   patient size, and iterative reconstruction techniques. The specific techniques   used on this CT exam have been documented in the patient's electronic medical   record. Digital Imaging and Communications in Medicine (DICOM) format image   data are available to nonaffiliated external healthcare facilities or entities   on a secure, media free, reciprocally searchable basis with patient   authorization for at least a 12-month period after this study. _______________       FINDINGS:       LOWER THORAX:  No acute pulmonary infiltrate. No pleural effusion. No global   cardiomegaly or pericardial effusion. Left and right breast saline implants. RIGHT KIDNEY: Punctate nonobstructive 2 mm right renal calculus is seen   dependently in the right renal collecting system. No right-sided hydronephrosis   or evidence of an acute obstructive uropathy. The right ureter is nondilated   and there is no evidence of ureteral calculi. LEFT KIDNEY: Nonobstructive left renal calculi measure 2-3 mm. No left-sided   hydronephrosis. The left ureter is nondilated and there is no evidence of   ureteral calculi. LIVER AND BILIARY: No suspicious liver lesions on this unenhanced CT. No   biliary dilation. Gallbladder unremarkable. SPLEEN:  Normal.       PANCREAS:  Normal.       ADRENALS:  Normal.       LYMPH NODES:  No mesenteric or retroperitoneal lymphadenopathy. GI TRACT:  Small hiatal hernia. Normal caliber small and large bowel loops. No   morphology of bowel obstruction. No bowel wall thickening. Normal appendix. PELVIC ORGANS:  Bladder is normal in appearance. No acute abnormality. VASCULATURE:  Normal caliber lower thoracic and abdominal aorta. OTHER:   No ascites or free intraperitoneal air. OSSEOUS STRUCTURES:  No acute osseous abnormality. _______________               CXR Results  (Last 48 hours)    None                Medical Decision Making   I am the first provider for this patient. I reviewed the vital signs, available nursing notes, past medical history, past surgical history, family history and social history. Vital Signs-Reviewed the patient's vital signs. Pulse Oximetry Analysis - 100% on RA     Records Reviewed: Nursing Notes, Old Medical Records, Previous Radiology Studies and Previous Laboratory Studies    Provider Notes (Medical Decision Making): Very likely recurrent kidney stone. Will expand blood work to consider biliary disease or perforation. GI or pulmonary sources are very unlikely. Will scan for recurrence. Plan to discuss after results with urologist.    PROCEDURES:  Procedures    MEDICATIONS GIVEN IN THE ED:  Medications   sodium chloride 0.9 % bolus infusion 1,000 mL (1,000 mL IntraVENous New Bag 4/17/20 0755)   ketorolac (TORADOL) injection 30 mg (30 mg IntraVENous Given 4/17/20 0740)   dicyclomine (BENTYL) capsule 20 mg (20 mg Oral Given 4/17/20 0740)   ondansetron (ZOFRAN) injection 4 mg (4 mg IntraVENous Given 4/17/20 0740)        ED COURSE:   6:50 AM   Initial assessment performed. PROGRESS NOTE:  8:16 AM  Updated patient on all results and plan. All questions answered.     8:17 AM  Imaging and labs consistent with likely passed small kidney stone. Culture sent of urine. Discussed these findings with patient. Plan for discharge with early primary care and urology follow-up with return precautions. Patient understands and agrees with this plan. Diagnosis and Disposition       DISCHARGE NOTE:  8:17 AM    The patient is ready for discharge. The patient's signs, symptoms, diagnosis, and discharge instructions have been discussed and the patient and/or family has conveyed their understanding. The patient and/or family is to follow up as recommended or return to the ER should their symptoms worsen. Plan has been discussed and the patient and/or family is in agreement. Written by Herminio Rivas ED Scribe, as dictated by Araceli Wright MD.     CLINICAL IMPRESSION:  1. Flank pain    2. Hematuria, unspecified type        PLAN:  1. D/C Home  2. Current Discharge Medication List        3. Follow-up Information     Follow up With Specialties Details Why Contact Info    Nando Lawton MD Internal Medicine Schedule an appointment as soon as possible for a visit Or Your Primary Care Doctor [address]      Tereza Hoang MD Urology Schedule an appointment as soon as possible for a visit Or Your Urologist [address] [address]  [phone]      [facility] EMERGENCY DEPT Emergency Medicine  If symptoms worsen [address] [address]  [phone]        _______________________________    Attestations: This note is prepared by Blease Oppenheim, acting as Scribe for Eric. Naren Wright MD.    Eric. Naren Wright MD:  The scribe's documentation has been prepared under my direction and personally reviewed by me in its entirety.  I confirm that the note above accurately reflects all work, treatment, procedures, and medical decision making performed by me.  _______________________________

## 2020-04-17 NOTE — DISCHARGE INSTRUCTIONS
Patient Education        Flank Pain: Care Instructions  Your Care Instructions  Flank pain is pain on the side of the back just below the rib cage and above the waist. It can be on one or both sides. Flank pain has many possible causes, including a kidney stone, a urinary tract infection, or back strain. Flank pain may get better on its own. But don't ignore new symptoms, such as fever, nausea and vomiting, urination problems, pain that gets worse, and dizziness. These may be signs of a more serious problem. You may have to have tests or other treatment. Follow-up care is a key part of your treatment and safety. Be sure to make and go to all appointments, and call your doctor if you are having problems. It's also a good idea to know your test results and keep a list of the medicines you take. How can you care for yourself at home? · Rest until you feel better. · Take pain medicines exactly as directed. ? If the doctor gave you a prescription medicine for pain, take it as prescribed. ? If you are not taking a prescription pain medicine, ask your doctor if you can take an over-the-counter pain medicine, such as acetaminophen (Tylenol), ibuprofen (Advil, Motrin), or naproxen (Aleve). Read and follow all instructions on the label. · If your doctor prescribed antibiotics, take them as directed. Do not stop taking them just because you feel better. You need to take the full course of antibiotics. · To apply heat, put a warm water bottle, a heating pad set on low, or a warm cloth on the painful area. Do not go to sleep with a heating pad on your skin. · To prevent dehydration, drink plenty of fluids, enough so that your urine is light yellow or clear like water. Choose water and other caffeine-free clear liquids until you feel better. If you have kidney, heart, or liver disease and have to limit fluids, talk with your doctor before you increase the amount of fluids you drink. When should you call for help?   Call your doctor now or seek immediate medical care if:    · Your flank pain gets worse.     · You have new symptoms, such as fever, nausea, or vomiting.     · You have symptoms of a urinary problem. For example:  ? You have blood or pus in your urine. ? You have chills or body aches. ? It hurts to urinate. ? You have groin or belly pain.    Watch closely for changes in your health, and be sure to contact your doctor if you do not get better as expected. Where can you learn more? Go to http://li-marshal.info/  Enter S191 in the search box to learn more about \"Flank Pain: Care Instructions. \"  Current as of: June 26, 2019Content Version: 12.4  © 2533-6002 [employer], [employer]orporated. Care instructions adapted under license by [employer] (which disclaims liability or warranty for this information). If you have questions about a medical condition or this instruction, always ask your healthcare professional. [place] [address] any warranty or liability for your use of this information. Patient Education        Blood in the Urine: Care Instructions  Your Care Instructions    Blood in the urine, or hematuria, may make the urine look red, brown, or pink. There may be blood every time you urinate or just from time to time. You cannot always see blood in the urine, but it will show up in a urine test.  Blood in the urine may be serious. It should always be checked by a doctor. Your doctor may recommend more tests, including an X-ray, a CT scan, or a cystoscopy (which lets a doctor look inside the urethra and bladder). Blood in the urine can be a sign of another problem. Common causes are bladder infections and kidney stones. An injury to your groin or your genital area can also cause bleeding in the urinary tract. Very hard exercise--such as running a marathon--can cause blood in the urine.  Blood in the urine can also be a sign of kidney disease or cancer in the bladder or kidney. Many cases of blood in the urine are caused by a harmless condition that runs in families. This is called benign familial hematuria. It does not need any treatment. Sometimes your urine may look red or brown even though it does not contain blood. For example, not getting enough fluids (dehydration), taking certain medicines, or having a liver problem can change the color of your urine. Eating foods such as beets, rhubarb, or blackberries or foods with red food coloring can make your urine look red or pink. Follow-up care is a key part of your treatment and safety. Be sure to make and go to all appointments, and call your doctor if you are having problems. It's also a good idea to know your test results and keep a list of the medicines you take. When should you call for help? Call your doctor now or seek immediate medical care if:    · You have symptoms of a urinary infection. For example:  ? You have pus in your urine. ? You have pain in your back just below your rib cage. This is called flank pain. ? You have a fever, chills, or body aches. ? It hurts to urinate. ? You have groin or belly pain.     · You have more blood in your urine.    Watch closely for changes in your health, and be sure to contact your doctor if:    · You have new urination problems.     · You do not get better as expected. Where can you learn more? Go to http://li-marshal.info/  Enter C312 in the search box to learn more about \"Blood in the Urine: Care Instructions. \"  Current as of: August 21, 2019Content Version: 12.4  © 4760-0052 [employer]wise, Incorporated. Care instructions adapted under license by [employer] (which disclaims liability or warranty for this information). If you have questions about a medical condition or this instruction, always ask your healthcare professional. [address] any warranty or liability for your use of this information.

## 2020-04-18 LAB
BACTERIA SPEC CULT: NORMAL
SERVICE CMNT-IMP: NORMAL

## (undated) DEVICE — GDWIRE 3CM FLX-TIP 0.038X150CM -- BX/5 SENSOR

## (undated) DEVICE — DEVON™ KNEE AND BODY STRAP 60" X 3" (1.5 M X 7.6 CM): Brand: DEVON

## (undated) DEVICE — TRAP MUCUS SPECIMEN 40ML -- MEDICHOICE

## (undated) DEVICE — CATH URET 5FRX70CM W/OPN END -- BX/20

## (undated) DEVICE — BAG PRESSURE INFUSION 3 W STOPCOCK 3000 CC PREMIERPRO DISP

## (undated) DEVICE — DRAPE XR C ARM 41X74IN LF --

## (undated) DEVICE — KENDALL SCD EXPRESS SLEEVES, KNEE LENGTH, MEDIUM: Brand: KENDALL SCD

## (undated) DEVICE — URETERAL ACCESS SHEATH SET: Brand: NAVIGATOR HD

## (undated) DEVICE — SOLUTION IRRIG 3000ML 0.9% SOD CHL FLX CONT 0797208] ICU MEDICAL INC]

## (undated) DEVICE — CYSTO PACK: Brand: MEDLINE INDUSTRIES, INC.

## (undated) DEVICE — DUAL LUMEN URETERAL CATHETER